# Patient Record
Sex: FEMALE | Race: BLACK OR AFRICAN AMERICAN | NOT HISPANIC OR LATINO | Employment: FULL TIME | URBAN - METROPOLITAN AREA
[De-identification: names, ages, dates, MRNs, and addresses within clinical notes are randomized per-mention and may not be internally consistent; named-entity substitution may affect disease eponyms.]

---

## 2020-09-25 ENCOUNTER — OFFICE VISIT (OUTPATIENT)
Dept: URGENT CARE | Facility: CLINIC | Age: 30
End: 2020-09-25
Payer: COMMERCIAL

## 2020-09-25 VITALS
RESPIRATION RATE: 20 BRPM | HEART RATE: 83 BPM | WEIGHT: 150 LBS | HEIGHT: 66 IN | TEMPERATURE: 98 F | BODY MASS INDEX: 24.11 KG/M2 | SYSTOLIC BLOOD PRESSURE: 143 MMHG | OXYGEN SATURATION: 100 % | DIASTOLIC BLOOD PRESSURE: 87 MMHG

## 2020-09-25 DIAGNOSIS — G89.29 CHRONIC LEFT-SIDED LOW BACK PAIN WITH LEFT-SIDED SCIATICA: Primary | ICD-10-CM

## 2020-09-25 DIAGNOSIS — M54.9 DORSALGIA, UNSPECIFIED: ICD-10-CM

## 2020-09-25 DIAGNOSIS — M54.42 CHRONIC LEFT-SIDED LOW BACK PAIN WITH LEFT-SIDED SCIATICA: Primary | ICD-10-CM

## 2020-09-25 PROCEDURE — 96372 PR INJECTION,THERAP/PROPH/DIAG2ST, IM OR SUBCUT: ICD-10-PCS | Mod: S$GLB,,, | Performed by: FAMILY MEDICINE

## 2020-09-25 PROCEDURE — 72100 X-RAY EXAM L-S SPINE 2/3 VWS: CPT | Mod: S$GLB,,, | Performed by: RADIOLOGY

## 2020-09-25 PROCEDURE — 99204 PR OFFICE/OUTPT VISIT, NEW, LEVL IV, 45-59 MIN: ICD-10-PCS | Mod: 25,S$GLB,, | Performed by: FAMILY MEDICINE

## 2020-09-25 PROCEDURE — 96372 THER/PROPH/DIAG INJ SC/IM: CPT | Mod: S$GLB,,, | Performed by: FAMILY MEDICINE

## 2020-09-25 PROCEDURE — 99204 OFFICE O/P NEW MOD 45 MIN: CPT | Mod: 25,S$GLB,, | Performed by: FAMILY MEDICINE

## 2020-09-25 PROCEDURE — 72100 XR LUMBAR SPINE 2 OR 3 VIEWS: ICD-10-PCS | Mod: S$GLB,,, | Performed by: RADIOLOGY

## 2020-09-25 RX ORDER — NAPROXEN 375 MG/1
375 TABLET ORAL 2 TIMES DAILY
Qty: 20 TABLET | Refills: 0 | Status: SHIPPED | OUTPATIENT
Start: 2020-09-25

## 2020-09-25 RX ORDER — CHLORZOXAZONE 500 MG/1
500 TABLET ORAL 4 TIMES DAILY PRN
Qty: 40 TABLET | Refills: 0 | Status: SHIPPED | OUTPATIENT
Start: 2020-09-25 | End: 2020-10-05

## 2020-09-25 RX ORDER — TRAMADOL HYDROCHLORIDE 50 MG/1
50 TABLET ORAL EVERY 6 HOURS PRN
Qty: 20 TABLET | Refills: 0 | Status: SHIPPED | OUTPATIENT
Start: 2020-09-25 | End: 2021-09-25

## 2020-09-25 RX ORDER — KETOROLAC TROMETHAMINE 30 MG/ML
30 INJECTION, SOLUTION INTRAMUSCULAR; INTRAVENOUS ONCE
Status: COMPLETED | OUTPATIENT
Start: 2020-09-25 | End: 2020-09-25

## 2020-09-25 RX ADMIN — KETOROLAC TROMETHAMINE 30 MG: 30 INJECTION, SOLUTION INTRAMUSCULAR; INTRAVENOUS at 07:09

## 2020-09-25 NOTE — LETTER
September 25, 2020  Sera Solis  [address]  [place] [address]                [facility]  [address], [facility] [address]  [place] [address]  Phone: [phone]  Fax: [phone] Sera Solis was seen and treated in our Urgent Care department on 9/25/2020. She may return to work in 2 - 3 days.      If you have any questions or concerns, please don't hesitate to call.        Sincerely,        Carlos Mckay MD

## 2020-09-26 NOTE — PROGRESS NOTES
"Subjective:       Patient ID: Sera Solis is a 30 y.o. female.    Vitals:  height is 5' 6" (1.676 m) and weight is 68 kg (150 lb). Her temperature is 98.2 °F (36.8 °C). Her blood pressure is 143/87 (abnormal) and her pulse is 83. Her respiration is 20 and oxygen saturation is 100%.     Chief Complaint: Leg Pain (siatic nerve pain)    Leg Pain   The incident occurred 12 to 24 hours ago (1 year off and on). Incident location: siatic nerve pain. The pain is present in the left leg, left hip, left thigh, left knee, left ankle and left heel. The quality of the pain is described as aching (throbbing). The pain is at a severity of 7/10. The pain is moderate. Associated symptoms include numbness. She has tried NSAIDs and acetaminophen for the symptoms. The treatment provided mild relief.       Constitution: Negative for chills, fatigue and fever.   HENT: Negative.    Neck: negative.   Cardiovascular: Negative.    Respiratory: Negative.    Genitourinary: Negative.  Negative for dysuria and urgency.   Musculoskeletal: Positive for pain, joint pain, joint swelling, abnormal ROM of joint, back pain and muscle ache. Negative for history of spine disorder.   Skin: Negative for rash.   Allergic/Immunologic: Negative.    Neurological: Positive for numbness.   Psychiatric/Behavioral: Negative.        Objective:      Physical Exam   Constitutional: She is oriented to person, place, and time. She appears well-developed. She is cooperative. No distress.   HENT:   Head: Normocephalic and atraumatic.   Nose: Nose normal.   Mouth/Throat: Oropharynx is clear and moist and mucous membranes are normal.   Eyes: Conjunctivae and lids are normal.   Neck: Trachea normal, normal range of motion, full passive range of motion without pain and phonation normal. Neck supple.   Cardiovascular: Normal rate, regular rhythm, normal heart sounds and normal pulses.   Pulmonary/Chest: Effort normal and breath sounds normal.   Abdominal: Soft. Normal " appearance and bowel sounds are normal. She exhibits no abdominal bruit, no pulsatile midline mass and no mass.   Musculoskeletal:         General: No deformity.      Lumbar back: She exhibits decreased range of motion, tenderness, pain and spasm.        Back:    Neurological: She is alert and oriented to person, place, and time. She has normal strength and normal reflexes. No sensory deficit.   Skin: Skin is warm, dry, intact and not diaphoretic. Psychiatric: Her speech is normal and behavior is normal. Judgment and thought content normal.   Nursing note and vitals reviewed.    Type of Interpretation: ED Physician (Independently Interpreted).  Radiology Procedure Done: Lumbar Spine X-Ray.  Interpretation: No fx seen.            Assessment:       1. Chronic left-sided low back pain with left-sided sciatica    2. Dorsalgia, unspecified        Plan:         Chronic left-sided low back pain with left-sided sciatica  -     ketorolac injection 30 mg  -     X-Ray Lumbar Spine 2 Or 3 Views; Future; Expected date: 09/25/2020  -     naproxen (NAPROSYN) 375 MG tablet; Take 1 tablet (375 mg total) by mouth 2 (two) times daily.  Dispense: 20 tablet; Refill: 0  -     chlorzoxazone (PARAFON FORTE) 500 mg Tab; Take 1 tablet (500 mg total) by mouth 4 (four) times daily as needed.  Dispense: 40 tablet; Refill: 0  -     traMADoL (ULTRAM) 50 mg tablet; Take 1 tablet (50 mg total) by mouth every 6 (six) hours as needed for Pain.  Dispense: 20 tablet; Refill: 0    Dorsalgia, unspecified  -     X-Ray Lumbar Spine 2 Or 3 Views; Future; Expected date: 09/25/2020     Please drink plenty of fluids.  Please get plenty of rest.  Please return here or go to the Emergency Department for any concerns or worsening of condition.  If you were prescribed a narcotic medication, do not drive or operate heavy equipment or machinery while taking these medications.  If you were not prescribed an anti-inflammatory medication, and if you do not have any  history of stomach/intestinal ulcers, or kidney disease, or are not taking a blood thinner such as Coumadin, Plavix, Pradaxa, Eloquis, or Xaralta for example, it is OK to take over the counter Ibuprofen or Advil or Motrin or Aleve as directed.  Do not take these medications on an empty stomach.  If you lose control of your bowel and/or bladder, please go to the nearest Emergency Department immediately.  If you lose sensation in between your legs by your genitalia and/or rectum, please go to the nearest Emergency Department immediately.  If you lose control or sensation of any extremity, please go to the nearest Emergency Department immediately.    Moist heat (heating Pad) several times a day to back for relief and comfort.  If you  smoke, please stop smoking.    Please follow up with your primary care doctor or specialist as needed.  Primary Doctor No  None    You must understand that you have received treatment at an Urgent Care facility only, and that you may be  released before all of your medical problems are known or treated. Urgent Care facilities are not equipped to  handle life threatening emergencies. It is recommended that you seek care at an Emergency Department for  further evaluation of worsening or concerning symptoms, or possibly life threatening conditions as  discussed.

## 2020-09-26 NOTE — PATIENT INSTRUCTIONS
Please drink plenty of fluids.  Please get plenty of rest.  Please return here or go to the Emergency Department for any concerns or worsening of condition.  If you were prescribed a narcotic medication, do not drive or operate heavy equipment or machinery while taking these medications.  If you were not prescribed an anti-inflammatory medication, and if you do not have any history of stomach/intestinal ulcers, or kidney disease, or are not taking a blood thinner such as Coumadin, Plavix, Pradaxa, Eloquis, or Xaralta for example, it is OK to take over the counter Ibuprofen or Advil or Motrin or Aleve as directed.  Do not take these medications on an empty stomach.  If you lose control of your bowel and/or bladder, please go to the nearest Emergency Department immediately.  If you lose sensation in between your legs by your genitalia and/or rectum, please go to the nearest Emergency Department immediately.  If you lose control or sensation of any extremity, please go to the nearest Emergency Department immediately.    Moist heat (heating Pad) several times a day to back for relief and comfort.  If you  smoke, please stop smoking.    Please follow up with your primary care doctor or specialist as needed.  Primary Doctor No  None    You must understand that you have received treatment at an Urgent Care facility only, and that you may be  released before all of your medical problems are known or treated. Urgent Care facilities are not equipped to  handle life threatening emergencies. It is recommended that you seek care at an Emergency Department for  further evaluation of worsening or concerning symptoms, or possibly life threatening conditions as  Discussed.    General Neck and Back Pain    Both neck and back pain are usually caused by injury to the muscles or ligaments of the spine. Sometimes the disks that separate each bone of the spine may cause pain by pressing on a nearby nerve. Back and neck pain may appear  after a sudden twisting or bending force (such as in a car accident), or sometimes after a simple awkward movement. In either case, muscle spasm is often present and adds to the pain.  Acute neck and back pain usually gets better in 1 to 2 weeks. Pain related to disk disease, arthritis in the spinal joints or spinal stenosis (narrowing of the spinal canal) can become chronic and last for months or years.  Back and neck pain are common problems. Most people feel better in 1 or 2 weeks, and most of the rest in 1 to 2 months. Most people can remain active.  People experience and describe pain differently.  · Pain can be sharp, stabbing, shooting, aching, cramping, or burning  · Movement, standing, bending, lifting, sitting, or walking may worsen the pain  · Pain can be localized to one spot or area, or it can be more generalized  · Pain can spread or radiate upwards, downwards, to the front, or go down your arms  · Muscle spasm may occur.  Most of the time mechanical problems with the muscles or spine cause the pain. it is usually caused by an injury, whether known or not, to the muscles or ligaments. While illnesses can cause back pain, it is usually not caused by a serious illness. Pain is usually related to physical activity, whether sports, exercise, work, or normal activity. Sometimes it can occur without an identifiable cause. This can happen simply by stretching or moving wrong, without noting pain at the time. Other causes include:  · Overexertion, lifting, pushing, pulling incorrectly or too aggressively.  · Sudden twisting, bending or stretching from an accident (car or fall), or accidental movement.  · Poor posture  · Poor conditioning, lack of regular exercise  · Spinal disc disease or arthritis  · Stress  · Pregnancy, or illness like appendicitis, bladder or kidney infection, pelvic infections   Home care  · For neck pain: Use a comfortable pillow that supports the head and keeps the spine in a neutral  position. The position of the head should not be tilted forward or backward.  · When in bed, try to find a position of comfort. A firm mattress is best. Try lying flat on your back with pillows under your knees. You can also try lying on your side with your knees bent up towards your chest and a pillow between your knees.  · At first, do not try to stretch out the sore spots. If there is a strain, it is not like the good soreness you get after exercising without an injury. In this case, stretching may make it worse.  · Avoid prolonged sitting, long car rides or travel. This puts more stress on the lower back than standing or walking.  · During the first 24 to 72 hours after an injury, apply an ice pack to the painful area for 20 minutes and then remove it for 20 minutes over a period of 60 to 90 minutes or several times a day.   · You can alternate ice and heat therapies. Talk with your healthcare provider about the best treatment for your back or neck pain. As a safety precaution, do not use a heating pad at bedtime. Sleeping with a heating pad can lead to skin burns or tissue damage.  · Therapeutic massage can help relax the back and neck muscles without stretching them.  · Be aware of safe lifting methods and do not lift anything over 15 pounds until all the pain is gone.  Medications  Talk to your healthcare provider before using medicine, especially if you have other medical problems or are taking other medicines.  · You may use over-the-counter medicine to control pain, unless another pain medicine was prescribed. If you have chronic conditions like diabetes, liver or kidney disease, stomach ulcers,  gastrointestinal bleeding, or are taking blood thinner medicines.  · Be careful if you are given pain medicines, narcotics, or medicine for muscle spasm. They can cause drowsiness, and can affect your coordination, reflexes, and judgment. Do not drive or operate heavy machinery.  Follow-up care  Follow up with  your healthcare provider, or as advised. Physical therapy or further tests may be needed.  If X-rays were taken, you will be notified of any new findings that may affect your care.  Call 911  Seek emergency medical care if any of the following occur:  · Trouble breathing  · Confusion  · Very drowsy or trouble awakening  · Fainting or loss of consciousness  · Rapid or very slow heart rate  · Loss of bowel or bladder control  When to seek medical advice  Call your healthcare provider right away if any of these occur:  · Pain becomes worse or spreads into your arms or legs  · Weakness, numbness or pain in one or both arms or legs  · Numbness in the groin area  · Difficulty walking  · Fever of 100.4ºF (38ºC) or higher, or as directed by your healthcare provider  Date Last Reviewed: 7/1/2016 © 2000-2016 [employer]. [address] [place] [address]. All rights reserved. This information is not intended as a substitute for professional medical care. Always follow your healthcare professional's instructions.      Back Pain (Acute or Chronic)    Back pain is one of the most common problems. The good news is that most people feel better in 1 to 2 weeks, and most of the rest in 1 to 2 months. Most people can remain active.  People experience and describe pain differently; not everyone is the same.  · The pain can be sharp, stabbing, shooting, aching, cramping or burning.  · Movement, standing, bending, lifting, sitting, or walking may worsen pain.  · It can be localized to one spot or area, or it can be more generalized.  · It can spread or radiate upwards, to the front, or go down your arms or legs (sciatica).  · It can cause muscle spasm.  Most of the time, mechanical problems with the muscles or spine cause the pain. Mechanical problems are usually caused by an injury to the muscles or ligaments. While illness can cause back pain, it is usually not caused by a serious illness. Mechanical problems  include:   · Physical activity such as sports, exercise, work, or normal activity  · Overexertion, lifting, pushing, pulling incorrectly or too aggressively  · Sudden twisting, bending, or stretching from an accident, or accidental movement  · Poor posture  · Stretching or moving wrong, without noticing pain at the time  · Poor coordination, lack of regular exercise (check with your doctor about this)  · Spinal disc disease or arthritis  · Stress  Pain can also be related to pregnancy, or illness like appendicitis, bladder or kidney infections, pelvic infections, and many other things.  Acute back pain usually gets better in 1 to 2 weeks. Back pain related to disk disease, arthritis in the spinal joints or spinal stenosis (narrowing of the spinal canal) can become chronic and last for months or years.  Unless you had a physical injury (for example, a car accident or fall) X-rays are usually not needed for the initial evaluation of back pain. If pain continues and does not respond to medical treatment, X-rays and other tests may be needed.  Home care  Try these home care recommendations:  · When in bed, try to find a position of comfort. A firm mattress is best. Try lying flat on your back with pillows under your knees. You can also try lying on your side with your knees bent up towards your chest and a pillow between your knees.  · At first, do not try to stretch out the sore spots. If there is a strain, it is not like the good soreness you get after exercising without an injury. In this case, stretching may make it worse.  · Avoid prolong sitting, long car rides, or travel. This puts more stress on the lower back than standing or walking.  · During the first 24 to 72 hours after an acute injury or flare up of chronic back pain, apply an ice pack to the painful area for 20 minutes and then remove it for 20 minutes. Do this over a period of 60 to 90 minutes or several times a day. This will reduce swelling and pain.  Wrap the ice pack in a thin towel or plastic to protect your skin.  · You can start with ice, then switch to heat. Heat (hot shower, hot bath, or heating pad) reduces pain and works well for muscle spasms. Heat can be applied to the painful area for 20 minutes then remove it for 20 minutes. Do this over a period of 60 to 90 minutes or several times a day. Do not sleep on a heating pad. It can lead to skin burns or tissue damage.  · You can alternate ice and heat therapy. Talk with your doctor about the best treatment for your back pain.  · Therapeutic massage can help relax the back muscles without stretching them.  · Be aware of safe lifting methods and do not lift anything without stretching first.  Medicines  Talk to your doctor before using medicine, especially if you have other medical problems or are taking other medicines.  · You may use over-the-counter medicine as directed on the bottle to control pain, unless another pain medicine was prescribed. If you have chronic conditions like diabetes, liver or kidney disease, stomach ulcers, or gastrointestinal bleeding, or are taking blood thinners, talk to your doctor before taking any medicine.  · Be careful if you are given a prescription medicines, narcotics, or medicine for muscle spasms. They can cause drowsiness, affect your coordination, reflexes, and judgement. Do not drive or operate heavy machinery.  Follow-up care  Follow up with your healthcare provider, or as advised.   A radiologist will review any X-rays that were taken. Your provide will notify you of any new findings that may affect your care.  Call 911  Call emergency services if any of the following occur:  · Trouble breathing  · Confusion  · Very drowsy or trouble awakening  · Fainting or loss of consciousness  · Rapid or very slow heart rate  · Loss of bowel or bladder control  When to seek medical advice  Call your healthcare provider right away if any of these occur:   · Pain becomes worse  or spreads to your legs  · Weakness or numbness in one or both legs  · Numbness in the groin or genital area  Date Last Reviewed: 7/1/2016 © 2000-2016 [employer]. [address] [place] [address], [place] [address]. All rights reserved. This information is not intended as a substitute for professional medical care. Always follow your healthcare professional's instructions.      Back Care Tips    Caring for your back  These are things you can do to prevent a recurrence of acute back pain and to reduce symptoms from chronic back pain:  · Maintain a healthy weight. If you are overweight, losing weight will help most types of back pain.  · Exercise is an important part of recovery from most types of back pain. The muscles behind and in front of the spine support the back. This means strengthening both the back muscles and the abdominal muscles will provide better support for your spine.   · Swimming and brisk walking are good overall exercises to improve your fitness level.  · Practice safe lifting methods (below).  · Practice good posture when sitting, standing and walking. Avoid prolonged sitting. This puts more stress on the lower back than standing or walking.  · Wear quality shoes with sufficient arch support. Foot and ankle alignment can affect back symptoms. Women should avoid wearing high heels.  · Therapeutic massage can help relax the back muscles without stretching them.  · During the first 24 to 72 hours after an acute injury or flare-up of chronic back pain, apply an ice pack to the painful area for 20 minutes and then remove it for 20 minutes, over a period of 60 to 90 minutes, or several times a day. As a safety precaution, do not use a heating pad at bedtime. Sleeping on a heating pad can lead to skin burns or tissue damage.  · You can alternate ice and heat therapies.  Medications  Talk to your healthcare provider before using medicines, especially if you have other medical problems or are taking  other medicines.  · You may use acetaminophen or ibuprofen to control pain, unless your healthcare provider prescribed other pain medicine. If you have chronic conditions like diabetes, liver or kidney disease, stomach ulcers, or gastrointestinal bleeding, or are taking blood thinners, talk with your healthcare provider before taking any medicines.  · Be careful if you are given prescription pain medicines, narcotics, or medicine for muscle spasm. They can cause drowsiness, affect your coordination, reflexes, and judgment. Do not drive or operate heavy machinery while taking these types of medicines. Take prescription pain medicine only as prescribed by your healthcare provider.  Lumbar stretch  Here is a simple stretching exercise that will help relax muscle spasm and keep your back more limber. If exercise makes your back pain worse, dont do it.  · Lie on your back with your knees bent and both feet on the ground.  · Slowly raise your left knee to your chest as you flatten your lower back against the floor. Hold for 5 seconds.  · Relax and repeat the exercise with your right knee.  · Do 10 of these exercises for each leg.  Safe lifting method  · Dont bend over at the waist to lift an object off the floor.  Instead, bend your knees and hips in a squat.   · Keep your back and head upright  · Hold the object close to your body, directly in front of you.  · Straighten your legs to lift the object.   · Lower the object to the floor in the reverse fashion.  · If you must slide something across the floor, push it.  Posture tips  Sitting  Sit in chairs with straight backs or low-back support. Keep your knees lower than your hips, with your feet flat on the floor.  When driving, sit up straight. Adjust the seat forward so you are not leaning toward the steering wheel.  A small pillow or rolled towel behind your lower back may help if you are driving long distances.   Standing  When standing for long periods, shift most  of your weight to one leg at a time. Alternate legs every few minutes.   Sleeping  The best way to sleep is on your side with your knees bent. Put a low pillow under your head to support your neck in a neutral spine position. Avoid thick pillows that bend your neck to one side. Put a pillow between your legs to further relax your lower back. If you sleep on your back, put pillows under your knees to support your legs in a slightly flexed position. Use a firm mattress. If your mattress sags, replace it, or use a 1/2-inch plywood board under the mattress to add support.  Follow-up care  Follow up with your healthcare provider, or as advised.  If X-rays, a CT scan or an MRI scan were taken, they will be reviewed by a radiologist. You will be notified of any new findings that may affect your care.  Call 911  Seek emergency medical care if any of the following occur:  · Trouble breathing  · Confusion  · Very drowsy  · Fainting or loss of consciousness  · Rapid or very slow heart rate  · Loss of  bowel or bladder control  When to seek medical care  Call your healthcare provider if any of the following occur:  · Pain becomes worse or spreads to your arms or legs  · Weakness or numbness in one or both arms or legs  · Numbness in the groin area  Date Last Reviewed: 6/1/2016  © 0479-0219 [employer]. [address] [place] [address], [place] [address]. All rights reserved. This information is not intended as a substitute for professional medical care. Always follow your healthcare professional's instructions.

## 2021-05-04 ENCOUNTER — PATIENT MESSAGE (OUTPATIENT)
Dept: RESEARCH | Facility: HOSPITAL | Age: 31
End: 2021-05-04

## 2021-07-01 ENCOUNTER — PATIENT MESSAGE (OUTPATIENT)
Dept: ADMINISTRATIVE | Facility: OTHER | Age: 31
End: 2021-07-01

## 2022-06-07 ENCOUNTER — OFFICE VISIT (OUTPATIENT)
Dept: URGENT CARE | Facility: CLINIC | Age: 32
End: 2022-06-07
Payer: COMMERCIAL

## 2022-06-07 VITALS
BODY MASS INDEX: 22.34 KG/M2 | HEART RATE: 78 BPM | SYSTOLIC BLOOD PRESSURE: 125 MMHG | WEIGHT: 139 LBS | TEMPERATURE: 98 F | RESPIRATION RATE: 16 BRPM | HEIGHT: 66 IN | DIASTOLIC BLOOD PRESSURE: 76 MMHG | OXYGEN SATURATION: 98 %

## 2022-06-07 DIAGNOSIS — R11.10 VOMITING, INTRACTABILITY OF VOMITING NOT SPECIFIED, PRESENCE OF NAUSEA NOT SPECIFIED, UNSPECIFIED VOMITING TYPE: Primary | ICD-10-CM

## 2022-06-07 LAB
CTP QC/QA: YES
CTP QC/QA: YES
MOLECULAR STREP A: NEGATIVE
SARS-COV-2 RDRP RESP QL NAA+PROBE: NEGATIVE

## 2022-06-07 PROCEDURE — 1159F PR MEDICATION LIST DOCUMENTED IN MEDICAL RECORD: ICD-10-PCS | Mod: CPTII,S$GLB,, | Performed by: NURSE PRACTITIONER

## 2022-06-07 PROCEDURE — U0002: ICD-10-PCS | Mod: QW,S$GLB,, | Performed by: NURSE PRACTITIONER

## 2022-06-07 PROCEDURE — 3074F SYST BP LT 130 MM HG: CPT | Mod: CPTII,S$GLB,, | Performed by: NURSE PRACTITIONER

## 2022-06-07 PROCEDURE — 3008F BODY MASS INDEX DOCD: CPT | Mod: CPTII,S$GLB,, | Performed by: NURSE PRACTITIONER

## 2022-06-07 PROCEDURE — 1160F RVW MEDS BY RX/DR IN RCRD: CPT | Mod: CPTII,S$GLB,, | Performed by: NURSE PRACTITIONER

## 2022-06-07 PROCEDURE — 99214 OFFICE O/P EST MOD 30 MIN: CPT | Mod: S$GLB,,, | Performed by: NURSE PRACTITIONER

## 2022-06-07 PROCEDURE — 87651 STREP A DNA AMP PROBE: CPT | Mod: QW,S$GLB,, | Performed by: NURSE PRACTITIONER

## 2022-06-07 PROCEDURE — U0002 COVID-19 LAB TEST NON-CDC: HCPCS | Mod: QW,S$GLB,, | Performed by: NURSE PRACTITIONER

## 2022-06-07 PROCEDURE — 99214 PR OFFICE/OUTPT VISIT, EST, LEVL IV, 30-39 MIN: ICD-10-PCS | Mod: S$GLB,,, | Performed by: NURSE PRACTITIONER

## 2022-06-07 PROCEDURE — 1159F MED LIST DOCD IN RCRD: CPT | Mod: CPTII,S$GLB,, | Performed by: NURSE PRACTITIONER

## 2022-06-07 PROCEDURE — 3078F DIAST BP <80 MM HG: CPT | Mod: CPTII,S$GLB,, | Performed by: NURSE PRACTITIONER

## 2022-06-07 PROCEDURE — 87651 POCT STREP A MOLECULAR: ICD-10-PCS | Mod: QW,S$GLB,, | Performed by: NURSE PRACTITIONER

## 2022-06-07 PROCEDURE — 3074F PR MOST RECENT SYSTOLIC BLOOD PRESSURE < 130 MM HG: ICD-10-PCS | Mod: CPTII,S$GLB,, | Performed by: NURSE PRACTITIONER

## 2022-06-07 PROCEDURE — 3008F PR BODY MASS INDEX (BMI) DOCUMENTED: ICD-10-PCS | Mod: CPTII,S$GLB,, | Performed by: NURSE PRACTITIONER

## 2022-06-07 PROCEDURE — 1160F PR REVIEW ALL MEDS BY PRESCRIBER/CLIN PHARMACIST DOCUMENTED: ICD-10-PCS | Mod: CPTII,S$GLB,, | Performed by: NURSE PRACTITIONER

## 2022-06-07 PROCEDURE — 3078F PR MOST RECENT DIASTOLIC BLOOD PRESSURE < 80 MM HG: ICD-10-PCS | Mod: CPTII,S$GLB,, | Performed by: NURSE PRACTITIONER

## 2022-06-07 RX ORDER — ONDANSETRON 4 MG/1
4 TABLET, FILM COATED ORAL DAILY PRN
Qty: 6 TABLET | Refills: 1 | Status: SHIPPED | OUTPATIENT
Start: 2022-06-07 | End: 2023-06-07

## 2022-06-07 NOTE — PROGRESS NOTES
"Subjective:       Patient ID: Sera Solis is a 32 y.o. female.    Vitals:  height is 5' 6" (1.676 m) and weight is 63 kg (139 lb). Her tympanic temperature is 97.8 °F (36.6 °C). Her blood pressure is 125/76 and her pulse is 78. Her respiration is 16 and oxygen saturation is 98%.     Chief Complaint: Emesis and Headache    Emesis   This is a recurrent problem. The current episode started in the past 7 days. The problem occurs 2 to 4 times per day. The problem has been unchanged. The emesis has an appearance of bile. There has been no fever. Associated symptoms include headaches. Pertinent negatives include no diarrhea or fever. She has tried nothing for the symptoms. The treatment provided no relief.   Headache   Associated symptoms include nausea and vomiting (x 2 yesterday ). Pertinent negatives include no fever.       Constitution: Positive for appetite change (decreased). Negative for activity change, fatigue, fever and generalized weakness.   HENT: Negative.    Gastrointestinal: Positive for nausea and vomiting (x 2 yesterday ). Negative for diarrhea.   Genitourinary: Negative for missed menses (LMP 2 weeks ago ).   Neurological: Positive for headaches.       Objective:      Physical Exam   HENT:   Head: Normocephalic.   Nose: Nose normal.   Mouth/Throat: Mucous membranes are moist.   Eyes: Pupils are equal, round, and reactive to light.   Cardiovascular: Normal rate.   Pulmonary/Chest: Effort normal. No stridor. No respiratory distress. She has no wheezes. She has no rhonchi. She has no rales. She exhibits no tenderness.   Abdominal: Bowel sounds are normal. She exhibits no distension and no mass. There is no abdominal tenderness. There is no rebound, no guarding, no left CVA tenderness and no right CVA tenderness. No hernia.   Neurological: She is alert.   Skin: Skin is warm. Capillary refill takes less than 2 seconds.   Psychiatric: Mood normal.   Nursing note and vitals reviewed.        Assessment:     "   1. Vomiting, intractability of vomiting not specified, presence of nausea not specified, unspecified vomiting type        Results for orders placed or performed in visit on 06/07/22   POCT COVID-19 Rapid Screening   Result Value Ref Range    POC Rapid COVID Negative Negative     Acceptable Yes    POCT Strep A, Molecular   Result Value Ref Range    Molecular Strep A, POC Negative Negative     Acceptable Yes        Plan:     and Vomiting, Adult ED   General Information   You came to the Emergency Department (ED) for nausea and vomiting. When you feel sick to your stomach, this is nausea. When you throw up, this is vomiting. Often, nausea and vomiting are caused by a virus. But they can also be caused by more serious things like an infection around the brain. The staff felt the risk of a serious cause for your nausea and vomiting is low.  If a virus is causing your nausea and vomiting, it is easy to spread from person to person. You can lower this risk by washing your hands often. Most of the time, your symptoms will go away without treatment in a few days.  You may be waiting on some test results. The staff will contact you if there are concerning results.  What care is needed at home?   · Call your regular doctor to let them know you were in the ED. Make a follow-up appointment if you were told to.  · Drink small amounts of fluid every 15 to 30 minutes. Good fluids to drink are water, broth, and oral electrolyte solutions. Sugar-free or very low sugar sports drinks are also OK.  · Once you feel you can eat, start with crackers, toast, or cereal. Then eat small amounts of food more often.  · Wash your hands often. This will help keep others healthy.  · Avoid alcohol and caffeine.  · If you have diabetes, check your blood sugar more often than usual. Being sick can affect your blood sugar levels.  When do I need to get emergency help?   · Call for an ambulance right away if:   ? You have  vomiting along with a fever and severe headache or stiff neck.  ? You have vomiting along with severe chest or belly pain or trouble breathing.  ? You are vomiting large amounts of blood (more than 1 teaspoon or 5 mL).  · Return to the ED if:   ? You have signs of severe fluid loss, such as:  § No urine for more than 8 hours.  § Feel very light-headed or like you are going to pass out.  § Feel weak like you are going to fall.  § Feel like your heart is beating very fast.  ? You feel extremely weak, like you are going to pass out.  ? You are vomiting multiple times every hour.  When do I need to call the doctor?   · You develop early signs of fluid loss, such as:  ? Dark-colored urine.  ? Dry mouth.  ? Muscle cramps.  ? Lack of energy.  ? Feeling light-headed when you get up.  · You have a small amount of blood (less than 1 teaspoon or 5 mL) in your vomit or bowel movements.  · You throw up something that looks like coffee grounds.  · You have a bowel movement that is black and looks like tar.  · You are not able to keep fluids down.  · You have a fever of 100.4º F (38°C) or higher or chills that do not go away after a day.  · You have new or worsening symptoms.  Last Reviewed Date   2021-05-21  Consumer Information Use and Disclaimer   This information is not specific medical advice and does not replace information you receive from your health care provider. This is only a brief summary of general information. It does NOT include all information about conditions, illnesses, injuries, tests, procedures, treatments, therapies, discharge instructions or life-style choices that may apply to you. You must talk with your health care provider for complete information about your health and treatment options. This information should not be used to decide whether or not to accept your health care providers advice, instructions or recommendations. Only your health care provider has the knowledge and training to provide advice  that is right for you.    Vomiting, intractability of vomiting not specified, presence of nausea not specified, unspecified vomiting type  -     Cancel: COVID-19 Routine Screening  -     POCT COVID-19 Rapid Screening  -     POCT Strep A, Molecular

## 2024-06-27 ENCOUNTER — OFFICE VISIT (OUTPATIENT)
Dept: URGENT CARE | Facility: CLINIC | Age: 34
End: 2024-06-27
Payer: COMMERCIAL

## 2024-06-27 VITALS
TEMPERATURE: 98 F | HEART RATE: 73 BPM | WEIGHT: 167 LBS | DIASTOLIC BLOOD PRESSURE: 88 MMHG | RESPIRATION RATE: 19 BRPM | SYSTOLIC BLOOD PRESSURE: 138 MMHG | OXYGEN SATURATION: 98 % | HEIGHT: 66 IN | BODY MASS INDEX: 26.84 KG/M2

## 2024-06-27 DIAGNOSIS — M54.6 ACUTE MIDLINE THORACIC BACK PAIN: Primary | ICD-10-CM

## 2024-06-27 DIAGNOSIS — M25.512 ACUTE PAIN OF LEFT SHOULDER: ICD-10-CM

## 2024-06-27 PROCEDURE — 72100 X-RAY EXAM L-S SPINE 2/3 VWS: CPT | Mod: S$GLB,,, | Performed by: RADIOLOGY

## 2024-06-27 RX ORDER — CYCLOBENZAPRINE HCL 10 MG
10 TABLET ORAL 3 TIMES DAILY PRN
Qty: 15 TABLET | Refills: 0 | Status: SHIPPED | OUTPATIENT
Start: 2024-06-27 | End: 2024-07-02

## 2024-06-27 RX ORDER — KETOROLAC TROMETHAMINE 30 MG/ML
30 INJECTION, SOLUTION INTRAMUSCULAR; INTRAVENOUS
Status: COMPLETED | OUTPATIENT
Start: 2024-06-27 | End: 2024-06-27

## 2024-06-27 RX ORDER — PREDNISONE 20 MG/1
20 TABLET ORAL DAILY
Qty: 3 TABLET | Refills: 0 | Status: SHIPPED | OUTPATIENT
Start: 2024-06-27 | End: 2024-06-30

## 2024-06-27 RX ADMIN — KETOROLAC TROMETHAMINE 30 MG: 30 INJECTION, SOLUTION INTRAMUSCULAR; INTRAVENOUS at 03:06

## 2024-06-27 NOTE — PATIENT INSTRUCTIONS
1.  Take all medications as directed.  See the attached handout for more information regarding your conditions and how to treat them.   2.  Rest and keep yourself/patient well hydrated.  3.  You can alternate Tylenol and Motrin every 4-6 hours for fever above 100.4F and/or pain.   4. You should schedule a follow-up appointment with your Primary Care Provider for recheck in 2-3 days or as directed at this visit.   5.  If your condition fails to improve in a timely manner, you should receive another evaluation by your Primary Care Provider to discuss your concerns or return to urgent care for a recheck.  If your condition worsens at any time, you should report immediately to your nearest Emergency Department for further evaluation. **You must understand that you have received Urgent Care treatment only and that you may be released before all of your medical problems are known or treated. You, the patient, are responsible to arrange for follow-up care as instructed.         BACK PAIN      Alternate heat and ice for first 48 hours then  apply heat. You may do gently stretching if tolerable.    Moist warm compresss to area several times daily.  May use a heating pad on LOW to provide heat over a towel which was dampended with warm water. DO NOT FALL ASLEEP WITH HEATING PAD ON.  Do not stay in one position to long.  When sleeping on your back place a pillow under knees to reduce tension on back.  If sleeping on your side, place pillow between knees to keep spine in better alinement.  Wear supportive shoes such as tennis shoes for support of the lower back.  Take any medication as directed.    If you were prescribed a narcotic medication or muscle relaxer, do not drive or operate heavy equipment or machinery while taking these medications.    You should schedule a follow-up appointment with your Primary Care Provider for a recheck in 2-3 days.     If your condition fails to improve in a timely manner, you should receive  another evaluation by your Primary Care Provider to discuss your concerns.      If your condition worsens at any time, including but not limited to loss of bowel and/or bladder control, lose sensation between your legs or genital and/or rectal area, lose control or sensation to any extremity, you should report immediately to your nearest Emergency Department for further evaluation.  **You must understand that you have received Urgent Care treatment only and that you may be released before all of your medical problems are known or treated. You, the patient, are responsible to arrange for follow-up care as instructed.

## 2024-06-27 NOTE — PROGRESS NOTES
"Subjective:      Patient ID: Sera Solis is a 34 y.o. female.    Vitals:  height is 5' 6" (1.676 m) and weight is 75.8 kg (167 lb). Her oral temperature is 98.3 °F (36.8 °C). Her blood pressure is 138/88 and her pulse is 73. Her respiration is 19 and oxygen saturation is 98%.     Chief Complaint: Back Pain    33 yo F here with bilateral mid back pain x1 week. Denies injury, trauma, heavy lifting, overuse.  Reports she just woke up with the pain last week. Treated at home with meloxicam, ibuprofen 600 and Robaxin with minimal relief.  Using her L arm to aid with position change from laying to sitting and now feels like she has strained her L shoulder.  Complaining of pain to L posterior shoulder.     Back Pain  This is a new problem. The current episode started in the past 7 days. The problem occurs constantly. The problem has been gradually worsening since onset. The quality of the pain is described as aching. The pain is severe. The symptoms are aggravated by lying down, bending, twisting and standing. Stiffness is present All day. Pertinent negatives include no bladder incontinence, bowel incontinence, fever, headaches, leg pain, numbness or weakness. She has tried muscle relaxant and NSAIDs for the symptoms. The treatment provided mild relief.       Constitution: Negative for fever.   Gastrointestinal:  Negative for bowel incontinence.   Genitourinary:  Negative for flank pain and bladder incontinence.   Musculoskeletal:  Positive for back pain. Negative for trauma.   Skin:  Negative for wound, lesion, erythema and bruising.   Neurological:  Negative for headaches and numbness.      Objective:     Physical Exam   Constitutional: She is oriented to person, place, and time. She appears well-developed. She is cooperative.  Non-toxic appearance. She does not appear ill. No distress.   HENT:   Head: Normocephalic and atraumatic.   Nose: Nose normal.   Mouth/Throat: Oropharynx is clear and moist and mucous membranes " are normal.   Eyes: Conjunctivae and lids are normal.   Neck: Trachea normal and phonation normal. Neck supple.   Cardiovascular: Normal rate, regular rhythm, normal heart sounds and normal pulses.   Pulmonary/Chest: Effort normal and breath sounds normal.   Abdominal: Normal appearance and bowel sounds are normal. She exhibits no mass. Soft.   Musculoskeletal: Normal range of motion.         General: Tenderness (to posterior L shoulder and mid back) present. No swelling, deformity or signs of injury. Normal range of motion.      Left shoulder: She exhibits tenderness. She exhibits no swelling, no crepitus, no deformity, no laceration and normal pulse.      Thoracic back: She exhibits tenderness. She exhibits no swelling, no edema, no deformity and no laceration.   Neurological: She is alert and oriented to person, place, and time. She has normal strength and normal reflexes. No sensory deficit.   Skin: Skin is warm, dry, intact and not diaphoretic. Capillary refill takes less than 2 seconds. No erythema   Psychiatric: Her speech is normal and behavior is normal. Judgment and thought content normal.   Nursing note and vitals reviewed.      Assessment:     1. Acute midline thoracic back pain    2. Acute pain of left shoulder      XR LUMBAR SPINE 2 OR 3 VIEWS    Result Date: 6/27/2024  EXAMINATION: XR LUMBAR SPINE 2 OR 3 VIEWS CLINICAL HISTORY: Pain in thoracic spine TECHNIQUE: Three views of the lumbar spine. COMPARISON: 09/25/2020 FINDINGS: Alignment is satisfactory.  No acute fracture or traumatic subluxation.  Disc spaces appear well maintained. Posterior elements are intact.  No osseous destruction.  Pedicles appear intact.     No acute radiographic abnormality. Electronically signed by: Carlos Wren Date:    06/27/2024 Time:    15:24     Plan:       Acute midline thoracic back pain  -     XR LUMBAR SPINE 2 OR 3 VIEWS; Future; Expected date: 06/27/2024  -     cyclobenzaprine (FLEXERIL) 10 MG tablet; Take 1  tablet (10 mg total) by mouth 3 (three) times daily as needed for Muscle spasms.  Dispense: 15 tablet; Refill: 0  -     Ambulatory referral/consult to Physical/Occupational Therapy  -     ketorolac injection 30 mg  -     predniSONE (DELTASONE) 20 MG tablet; Take 1 tablet (20 mg total) by mouth once daily. for 3 days  Dispense: 3 tablet; Refill: 0    Acute pain of left shoulder  -     cyclobenzaprine (FLEXERIL) 10 MG tablet; Take 1 tablet (10 mg total) by mouth 3 (three) times daily as needed for Muscle spasms.  Dispense: 15 tablet; Refill: 0  -     Ambulatory referral/consult to Physical/Occupational Therapy  -     ketorolac injection 30 mg  -     predniSONE (DELTASONE) 20 MG tablet; Take 1 tablet (20 mg total) by mouth once daily. for 3 days  Dispense: 3 tablet; Refill: 0      Patient Instructions   1.  Take all medications as directed.  See the attached handout for more information regarding your conditions and how to treat them.   2.  Rest and keep yourself/patient well hydrated.  3.  You can alternate Tylenol and Motrin every 4-6 hours for fever above 100.4F and/or pain.   4. You should schedule a follow-up appointment with your Primary Care Provider for recheck in 2-3 days or as directed at this visit.   5.  If your condition fails to improve in a timely manner, you should receive another evaluation by your Primary Care Provider to discuss your concerns or return to urgent care for a recheck.  If your condition worsens at any time, you should report immediately to your nearest Emergency Department for further evaluation. **You must understand that you have received Urgent Care treatment only and that you may be released before all of your medical problems are known or treated. You, the patient, are responsible to arrange for follow-up care as instructed.         BACK PAIN      Alternate heat and ice for first 48 hours then  apply heat. You may do gently stretching if tolerable.    Moist warm compresss to area  several times daily.  May use a heating pad on LOW to provide heat over a towel which was dampended with warm water. DO NOT FALL ASLEEP WITH HEATING PAD ON.  Do not stay in one position to long.  When sleeping on your back place a pillow under knees to reduce tension on back.  If sleeping on your side, place pillow between knees to keep spine in better alinement.  Wear supportive shoes such as tennis shoes for support of the lower back.  Take any medication as directed.    If you were prescribed a narcotic medication or muscle relaxer, do not drive or operate heavy equipment or machinery while taking these medications.    You should schedule a follow-up appointment with your Primary Care Provider for a recheck in 2-3 days.     If your condition fails to improve in a timely manner, you should receive another evaluation by your Primary Care Provider to discuss your concerns.      If your condition worsens at any time, including but not limited to loss of bowel and/or bladder control, lose sensation between your legs or genital and/or rectal area, lose control or sensation to any extremity, you should report immediately to your nearest Emergency Department for further evaluation.  **You must understand that you have received Urgent Care treatment only and that you may be released before all of your medical problems are known or treated. You, the patient, are responsible to arrange for follow-up care as instructed.